# Patient Record
Sex: MALE | Race: WHITE | NOT HISPANIC OR LATINO | Employment: FULL TIME | URBAN - METROPOLITAN AREA
[De-identification: names, ages, dates, MRNs, and addresses within clinical notes are randomized per-mention and may not be internally consistent; named-entity substitution may affect disease eponyms.]

---

## 2023-02-23 LAB
ALANINE AMINOTRANSFERASE (SGPT) (U/L) IN SER/PLAS: 60 U/L (ref 10–52)
ALBUMIN (G/DL) IN SER/PLAS: 4.4 G/DL (ref 3.4–5)
ALKALINE PHOSPHATASE (U/L) IN SER/PLAS: 86 U/L (ref 33–136)
ANION GAP IN SER/PLAS: 14 MMOL/L (ref 10–20)
ASPARTATE AMINOTRANSFERASE (SGOT) (U/L) IN SER/PLAS: 41 U/L (ref 9–39)
BASOPHILS (10*3/UL) IN BLOOD BY AUTOMATED COUNT: 0.07 X10E9/L (ref 0–0.1)
BASOPHILS/100 LEUKOCYTES IN BLOOD BY AUTOMATED COUNT: 1.2 % (ref 0–2)
BILIRUBIN TOTAL (MG/DL) IN SER/PLAS: 0.4 MG/DL (ref 0–1.2)
CALCIUM (MG/DL) IN SER/PLAS: 9.3 MG/DL (ref 8.6–10.6)
CARBON DIOXIDE, TOTAL (MMOL/L) IN SER/PLAS: 26 MMOL/L (ref 21–32)
CHLORIDE (MMOL/L) IN SER/PLAS: 101 MMOL/L (ref 98–107)
CREATININE (MG/DL) IN SER/PLAS: 0.82 MG/DL (ref 0.5–1.3)
EOSINOPHILS (10*3/UL) IN BLOOD BY AUTOMATED COUNT: 0.09 X10E9/L (ref 0–0.7)
EOSINOPHILS/100 LEUKOCYTES IN BLOOD BY AUTOMATED COUNT: 1.6 % (ref 0–6)
ERYTHROCYTE DISTRIBUTION WIDTH (RATIO) BY AUTOMATED COUNT: 12.6 % (ref 11.5–14.5)
ERYTHROCYTE MEAN CORPUSCULAR HEMOGLOBIN CONCENTRATION (G/DL) BY AUTOMATED: 32.7 G/DL (ref 32–36)
ERYTHROCYTE MEAN CORPUSCULAR VOLUME (FL) BY AUTOMATED COUNT: 90 FL (ref 80–100)
ERYTHROCYTES (10*6/UL) IN BLOOD BY AUTOMATED COUNT: 5 X10E12/L (ref 4.5–5.9)
GFR MALE: >90 ML/MIN/1.73M2
GLUCOSE (MG/DL) IN SER/PLAS: 90 MG/DL (ref 74–99)
HEMATOCRIT (%) IN BLOOD BY AUTOMATED COUNT: 44.9 % (ref 41–52)
HEMOGLOBIN (G/DL) IN BLOOD: 14.7 G/DL (ref 13.5–17.5)
IMMATURE GRANULOCYTES/100 LEUKOCYTES IN BLOOD BY AUTOMATED COUNT: 0.2 % (ref 0–0.9)
LEUKOCYTES (10*3/UL) IN BLOOD BY AUTOMATED COUNT: 5.8 X10E9/L (ref 4.4–11.3)
LYMPHOCYTES (10*3/UL) IN BLOOD BY AUTOMATED COUNT: 1.85 X10E9/L (ref 1.2–4.8)
LYMPHOCYTES/100 LEUKOCYTES IN BLOOD BY AUTOMATED COUNT: 32.1 % (ref 13–44)
MONOCYTES (10*3/UL) IN BLOOD BY AUTOMATED COUNT: 0.57 X10E9/L (ref 0.1–1)
MONOCYTES/100 LEUKOCYTES IN BLOOD BY AUTOMATED COUNT: 9.9 % (ref 2–10)
NEUTROPHILS (10*3/UL) IN BLOOD BY AUTOMATED COUNT: 3.18 X10E9/L (ref 1.2–7.7)
NEUTROPHILS/100 LEUKOCYTES IN BLOOD BY AUTOMATED COUNT: 55 % (ref 40–80)
NRBC (PER 100 WBCS) BY AUTOMATED COUNT: 0 /100 WBC (ref 0–0)
PLATELETS (10*3/UL) IN BLOOD AUTOMATED COUNT: 330 X10E9/L (ref 150–450)
POTASSIUM (MMOL/L) IN SER/PLAS: 4.3 MMOL/L (ref 3.5–5.3)
PROTEIN TOTAL: 6.8 G/DL (ref 6.4–8.2)
SODIUM (MMOL/L) IN SER/PLAS: 137 MMOL/L (ref 136–145)
UREA NITROGEN (MG/DL) IN SER/PLAS: 9 MG/DL (ref 6–23)

## 2023-07-21 PROBLEM — M54.2 CERVICAL PAIN: Status: ACTIVE | Noted: 2023-07-21

## 2023-07-21 PROBLEM — E27.40 ADRENAL INSUFFICIENCY (MULTI): Status: ACTIVE | Noted: 2023-07-21

## 2023-07-21 PROBLEM — M54.6 ACUTE MIDLINE THORACIC BACK PAIN: Status: ACTIVE | Noted: 2023-07-21

## 2023-07-21 PROBLEM — K11.1 ENLARGED SALIVARY GLAND: Status: ACTIVE | Noted: 2023-07-21

## 2023-07-21 PROBLEM — R07.89 CHEST HEAVINESS: Status: ACTIVE | Noted: 2023-07-21

## 2023-07-21 PROBLEM — H61.23 BILATERAL IMPACTED CERUMEN: Status: ACTIVE | Noted: 2023-07-21

## 2023-07-21 PROBLEM — N40.0 BPH (BENIGN PROSTATIC HYPERPLASIA): Status: ACTIVE | Noted: 2023-07-21

## 2023-07-21 PROBLEM — M41.9 SCOLIOSIS: Status: ACTIVE | Noted: 2023-07-21

## 2023-07-21 PROBLEM — J01.10 ACUTE FRONTAL SINUSITIS: Status: ACTIVE | Noted: 2023-07-21

## 2023-07-21 PROBLEM — M35.00: Status: ACTIVE | Noted: 2023-07-21

## 2023-07-21 PROBLEM — E55.9 VITAMIN D DEFICIENCY: Status: ACTIVE | Noted: 2023-07-21

## 2023-07-21 PROBLEM — J84.9 ILD (INTERSTITIAL LUNG DISEASE) (MULTI): Status: ACTIVE | Noted: 2023-07-21

## 2023-07-21 PROBLEM — E88.01 ALPHA-1-ANTITRYPSIN DEFICIENCY (MULTI): Status: ACTIVE | Noted: 2023-07-21

## 2023-07-21 PROBLEM — R05.3 CHRONIC COUGH: Status: ACTIVE | Noted: 2023-07-21

## 2023-07-21 PROBLEM — R06.02 SHORTNESS OF BREATH: Status: ACTIVE | Noted: 2023-07-21

## 2023-07-21 PROBLEM — J38.3 FALSE VOCAL CORD LESION: Status: ACTIVE | Noted: 2023-07-21

## 2023-07-21 PROBLEM — K82.8 BILIARY DYSKINESIA: Status: ACTIVE | Noted: 2023-07-21

## 2023-07-21 PROBLEM — M51.37 DDD (DEGENERATIVE DISC DISEASE), LUMBOSACRAL: Status: ACTIVE | Noted: 2023-07-21

## 2023-07-21 PROBLEM — M79.604 PAIN OF RIGHT LOWER EXTREMITY: Status: ACTIVE | Noted: 2023-07-21

## 2023-07-21 PROBLEM — J31.0 CHRONIC RHINITIS: Status: ACTIVE | Noted: 2023-07-21

## 2023-07-21 PROBLEM — M54.16 LUMBAR RADICULOPATHY: Status: ACTIVE | Noted: 2023-07-21

## 2023-07-21 PROBLEM — J34.2 DEVIATED NASAL SEPTUM: Status: ACTIVE | Noted: 2023-07-21

## 2023-07-21 PROBLEM — I10 BENIGN ESSENTIAL HYPERTENSION: Status: ACTIVE | Noted: 2023-07-21

## 2023-07-21 PROBLEM — R09.82 POSTNASAL DRIP: Status: ACTIVE | Noted: 2023-07-21

## 2023-07-21 PROBLEM — M25.522 LEFT ELBOW PAIN: Status: ACTIVE | Noted: 2023-07-21

## 2023-07-21 PROBLEM — M21.612 BILATERAL BUNIONS: Status: ACTIVE | Noted: 2023-07-21

## 2023-07-21 PROBLEM — M21.611 BILATERAL BUNIONS: Status: ACTIVE | Noted: 2023-07-21

## 2023-07-21 PROBLEM — M51.379 DDD (DEGENERATIVE DISC DISEASE), LUMBOSACRAL: Status: ACTIVE | Noted: 2023-07-21

## 2023-07-21 PROBLEM — J01.00 ACUTE MAXILLARY SINUSITIS: Status: ACTIVE | Noted: 2023-07-21

## 2023-07-21 PROBLEM — J30.9 ALLERGIC RHINITIS: Status: ACTIVE | Noted: 2023-07-21

## 2023-07-21 PROBLEM — J38.02 BILATERAL VOCAL CORD PARESIS: Status: ACTIVE | Noted: 2023-07-21

## 2023-07-21 PROBLEM — R93.89 ABNORMAL CHEST CT: Status: ACTIVE | Noted: 2023-07-21

## 2023-07-21 PROBLEM — D83.9 CVID (COMMON VARIABLE IMMUNODEFICIENCY) (MULTI): Status: ACTIVE | Noted: 2023-07-21

## 2023-07-21 PROBLEM — M25.50 POLYARTHRALGIA: Status: ACTIVE | Noted: 2023-07-21

## 2023-07-21 PROBLEM — R00.2 PALPITATIONS: Status: ACTIVE | Noted: 2023-07-21

## 2023-07-21 PROBLEM — J32.2 CHRONIC ETHMOIDAL SINUSITIS: Status: ACTIVE | Noted: 2023-07-21

## 2023-07-21 PROBLEM — R42 DIZZINESS: Status: ACTIVE | Noted: 2023-07-21

## 2023-07-21 PROBLEM — J38.3 GLOTTIC INSUFFICIENCY: Status: ACTIVE | Noted: 2023-07-21

## 2023-07-21 PROBLEM — R06.09 DYSPNEA ON EXERTION: Status: ACTIVE | Noted: 2023-07-21

## 2023-07-21 PROBLEM — M54.41 ACUTE MIDLINE LOW BACK PAIN WITH RIGHT-SIDED SCIATICA: Status: ACTIVE | Noted: 2023-07-21

## 2023-07-21 PROBLEM — J32.9 CHRONIC RECURRENT SINUSITIS: Status: ACTIVE | Noted: 2023-07-21

## 2023-07-21 PROBLEM — K57.90 DIVERTICULOSIS: Status: ACTIVE | Noted: 2023-07-21

## 2023-07-21 PROBLEM — M54.12 CERVICAL RADICULOPATHY: Status: ACTIVE | Noted: 2023-07-21

## 2023-07-21 PROBLEM — J45.909 ASTHMA IN ADULT (HHS-HCC): Status: ACTIVE | Noted: 2023-07-21

## 2023-07-21 PROBLEM — M79.2 RADICULAR PAIN OF LEFT UPPER EXTREMITY: Status: ACTIVE | Noted: 2023-07-21

## 2023-07-21 PROBLEM — R09.81 NASAL CONGESTION: Status: ACTIVE | Noted: 2023-07-21

## 2023-07-21 PROBLEM — D80.3 IGG DEFICIENCY (MULTI): Status: ACTIVE | Noted: 2023-07-21

## 2023-07-21 PROBLEM — J84.10 GRANULOMATOUS LUNG DISEASE (MULTI): Status: ACTIVE | Noted: 2023-07-21

## 2023-07-21 PROBLEM — G47.33 OBSTRUCTIVE SLEEP APNEA: Status: ACTIVE | Noted: 2023-07-21

## 2023-07-21 PROBLEM — J45.909 MODERATE ASTHMA WITHOUT COMPLICATION (HHS-HCC): Status: ACTIVE | Noted: 2023-07-21

## 2023-07-21 PROBLEM — R19.7 DIARRHEA: Status: ACTIVE | Noted: 2023-07-21

## 2023-07-21 PROBLEM — Q76.1 KLIPPEL-FEIL SYNDROME: Status: ACTIVE | Noted: 2023-07-21

## 2023-07-21 PROBLEM — J32.4 CHRONIC PANSINUSITIS: Status: ACTIVE | Noted: 2023-07-21

## 2023-07-21 PROBLEM — R43.8 DECREASED SENSE OF SMELL: Status: ACTIVE | Noted: 2023-07-21

## 2023-07-21 PROBLEM — K40.20 BILATERAL INGUINAL HERNIA: Status: ACTIVE | Noted: 2023-07-21

## 2023-07-21 PROBLEM — R91.8 LUNG INFILTRATE: Status: ACTIVE | Noted: 2023-07-21

## 2023-07-21 PROBLEM — R49.0 HOARSENESS OF VOICE: Status: ACTIVE | Noted: 2023-07-21

## 2023-07-21 PROBLEM — M54.50 ACUTE LOW BACK PAIN: Status: ACTIVE | Noted: 2023-07-21

## 2023-07-21 PROBLEM — M54.31 SCIATICA OF RIGHT SIDE: Status: ACTIVE | Noted: 2023-07-21

## 2023-07-21 PROBLEM — J20.9 ACUTE BRONCHITIS: Status: ACTIVE | Noted: 2023-07-21

## 2023-07-21 PROBLEM — R51.9 HEADACHE: Status: ACTIVE | Noted: 2023-07-21

## 2023-08-17 PROBLEM — Z00.00 WELL ADULT EXAM: Status: ACTIVE | Noted: 2023-08-17

## 2023-09-21 DIAGNOSIS — I10 BENIGN ESSENTIAL HYPERTENSION: ICD-10-CM

## 2023-09-22 RX ORDER — AMLODIPINE BESYLATE 5 MG/1
5 TABLET ORAL DAILY
Qty: 90 TABLET | Refills: 0 | Status: SHIPPED | OUTPATIENT
Start: 2023-09-22

## 2024-08-30 ENCOUNTER — OFFICE VISIT (OUTPATIENT)
Dept: PRIMARY CARE | Facility: CLINIC | Age: 64
End: 2024-08-30
Payer: COMMERCIAL

## 2024-08-30 VITALS
HEIGHT: 70 IN | BODY MASS INDEX: 24.2 KG/M2 | WEIGHT: 169 LBS | HEART RATE: 66 BPM | DIASTOLIC BLOOD PRESSURE: 82 MMHG | SYSTOLIC BLOOD PRESSURE: 126 MMHG

## 2024-08-30 DIAGNOSIS — I10 BENIGN ESSENTIAL HYPERTENSION: Primary | ICD-10-CM

## 2024-08-30 DIAGNOSIS — Z79.1 ENCOUNTER FOR MONITORING CHRONIC NSAID THERAPY: ICD-10-CM

## 2024-08-30 DIAGNOSIS — Z51.81 ENCOUNTER FOR MONITORING CHRONIC NSAID THERAPY: ICD-10-CM

## 2024-08-30 PROCEDURE — 1036F TOBACCO NON-USER: CPT | Performed by: INTERNAL MEDICINE

## 2024-08-30 PROCEDURE — 3074F SYST BP LT 130 MM HG: CPT | Performed by: INTERNAL MEDICINE

## 2024-08-30 PROCEDURE — 99213 OFFICE O/P EST LOW 20 MIN: CPT | Performed by: INTERNAL MEDICINE

## 2024-08-30 PROCEDURE — 3008F BODY MASS INDEX DOCD: CPT | Performed by: INTERNAL MEDICINE

## 2024-08-30 PROCEDURE — 3078F DIAST BP <80 MM HG: CPT | Performed by: INTERNAL MEDICINE

## 2024-08-30 RX ORDER — DULOXETIN HYDROCHLORIDE 60 MG/1
60 CAPSULE, DELAYED RELEASE ORAL
COMMUNITY
Start: 2024-03-21

## 2024-08-30 RX ORDER — OMEPRAZOLE 20 MG/1
20 CAPSULE, DELAYED RELEASE ORAL DAILY
Qty: 30 CAPSULE | Refills: 5 | Status: SHIPPED | OUTPATIENT
Start: 2024-08-30 | End: 2025-02-26

## 2024-08-30 RX ORDER — BUDESONIDE 0.5 MG/2ML
0.5 INHALANT ORAL AS NEEDED
COMMUNITY
Start: 2024-02-16

## 2024-08-30 RX ORDER — TEZEPELUMAB-EKKO 210 MG/1.9ML
210 INJECTION, SOLUTION SUBCUTANEOUS
COMMUNITY

## 2024-08-30 RX ORDER — FAMOTIDINE 40 MG/1
1 TABLET, FILM COATED ORAL
COMMUNITY
Start: 2024-04-15

## 2024-08-30 RX ORDER — MELOXICAM 15 MG/1
15 TABLET ORAL
COMMUNITY
Start: 2024-03-21

## 2024-08-30 RX ORDER — DICLOFENAC SODIUM 75 MG/1
75 TABLET, DELAYED RELEASE ORAL 2 TIMES DAILY
COMMUNITY
Start: 2023-10-12 | End: 2024-08-30 | Stop reason: WASHOUT

## 2024-08-30 ASSESSMENT — PATIENT HEALTH QUESTIONNAIRE - PHQ9
SUM OF ALL RESPONSES TO PHQ9 QUESTIONS 1 AND 2: 0
2. FEELING DOWN, DEPRESSED OR HOPELESS: NOT AT ALL
1. LITTLE INTEREST OR PLEASURE IN DOING THINGS: NOT AT ALL

## 2024-08-30 ASSESSMENT — ENCOUNTER SYMPTOMS: FATIGUE: 0

## 2024-08-30 NOTE — PROGRESS NOTES
"Subjective   Patient ID: Al Brown is a 64 y.o. male who presents for Hypertension.    Here for high blood pressure. Gets IVIG every 3 weeks and has been around 130-150s. Had run out of amlodipine but was resumed a week ago.    PMH:  -HTN: Takes amlodipine 5mg daily.  -Asthma: Saw Dr. Riojas once. Otherwise is managed by his immunologist.   -Arthritis, myalgias, cervical spondylosis: Sees Dr. Winchester for this. Takes mobic currently.  -Chronic neck pain: Sees spine specialist. Told he will need to get an MRI and see ortho for his R shoulder before fully addressing the neck.  -Sjogrens: Tested positive for this. Sees rheumatology.  -CVID: Gets IVIG every 3 weeks. Sees Dr. Hagan at [facility].        Review of Systems   Constitutional:  Negative for fatigue.       /82   Pulse 66   Ht 1.778 m (5' 10\")   Wt 76.7 kg (169 lb)   BMI 24.25 kg/m²   Objective   Physical Exam  Constitutional:       General: He is not in acute distress.     Appearance: He is not ill-appearing, toxic-appearing or diaphoretic.   HENT:      Head: Normocephalic and atraumatic.   Neurological:      Mental Status: He is alert.         Assessment/Plan   Problem List Items Addressed This Visit             ICD-10-CM    Benign essential hypertension - Primary I10     -BP controlled today. Likely BP worsened since pt had run out of amlodipine. Dr. ANDERS ordered a months worth. Pt to reach out when he needs this refilled.          Other Visit Diagnoses         Codes    Encounter for monitoring chronic NSAID therapy     Z51.81, Z79.1    Relevant Medications    omeprazole (PriLOSEC) 20 mg DR capsule        Pt due for colon cancer screening; to let us know if he wants to do a colonoscopy vs cologuard.         Ismael Noe MD 08/30/24 3:05 PM   "

## 2024-08-30 NOTE — ASSESSMENT & PLAN NOTE
-BP controlled today. Likely BP worsened since pt had run out of amlodipine. Dr. ANDERS ordered a months worth. Pt to reach out when he needs this refilled.

## 2024-09-11 ENCOUNTER — APPOINTMENT (OUTPATIENT)
Dept: PRIMARY CARE | Facility: CLINIC | Age: 64
End: 2024-09-11

## 2024-09-16 DIAGNOSIS — I10 BENIGN ESSENTIAL HYPERTENSION: ICD-10-CM

## 2024-09-16 RX ORDER — AMLODIPINE BESYLATE 5 MG/1
5 TABLET ORAL DAILY
Qty: 90 TABLET | Refills: 0 | Status: SHIPPED | OUTPATIENT
Start: 2024-09-16

## 2024-12-03 ENCOUNTER — APPOINTMENT (OUTPATIENT)
Dept: PRIMARY CARE | Facility: CLINIC | Age: 64
End: 2024-12-03
Payer: COMMERCIAL

## 2025-01-24 ENCOUNTER — APPOINTMENT (OUTPATIENT)
Dept: PRIMARY CARE | Facility: CLINIC | Age: 65
End: 2025-01-24
Payer: COMMERCIAL

## 2025-01-28 ENCOUNTER — APPOINTMENT (OUTPATIENT)
Dept: PRIMARY CARE | Facility: CLINIC | Age: 65
End: 2025-01-28
Payer: COMMERCIAL

## 2025-01-28 VITALS
WEIGHT: 178.5 LBS | BODY MASS INDEX: 25.61 KG/M2 | SYSTOLIC BLOOD PRESSURE: 148 MMHG | HEART RATE: 68 BPM | DIASTOLIC BLOOD PRESSURE: 72 MMHG | OXYGEN SATURATION: 97 % | RESPIRATION RATE: 16 BRPM

## 2025-01-28 DIAGNOSIS — I10 BENIGN ESSENTIAL HYPERTENSION: Primary | ICD-10-CM

## 2025-01-28 PROCEDURE — 3077F SYST BP >= 140 MM HG: CPT | Performed by: INTERNAL MEDICINE

## 2025-01-28 PROCEDURE — 3078F DIAST BP <80 MM HG: CPT | Performed by: INTERNAL MEDICINE

## 2025-01-28 PROCEDURE — 99213 OFFICE O/P EST LOW 20 MIN: CPT | Performed by: INTERNAL MEDICINE

## 2025-01-28 RX ORDER — AMLODIPINE BESYLATE 10 MG/1
10 TABLET ORAL DAILY
Qty: 90 TABLET | Refills: 3 | Status: SHIPPED | OUTPATIENT
Start: 2025-01-28

## 2025-01-28 ASSESSMENT — ENCOUNTER SYMPTOMS
DIZZINESS: 1
LIGHT-HEADEDNESS: 1

## 2025-01-28 NOTE — PROGRESS NOTES
Subjective   Patient ID: Al Brown is a 64 y.o. male who presents for Follow-up.    Here for blood pressure follow up. Has seen BP in his 150s systolic over 80-90s.    Is doing PT for his shoulder and back (doing water therapy and regular PT).    Has an EGD planned to evaluate for possible ulcer.    Got nerve blocks since last appt.        Review of Systems   Neurological:  Positive for dizziness and light-headedness.       /72 (BP Location: Right arm, Patient Position: Sitting)   Pulse 68   Resp 16   Wt 81 kg (178 lb 8 oz)   SpO2 97%   BMI 25.61 kg/m²   Objective   Physical Exam  Constitutional:       General: He is not in acute distress.     Appearance: He is not ill-appearing, toxic-appearing or diaphoretic.   HENT:      Head: Normocephalic and atraumatic.   Eyes:      Conjunctiva/sclera: Conjunctivae normal.   Neurological:      Mental Status: He is alert.         Assessment/Plan   Problem List Items Addressed This Visit             ICD-10-CM    Benign essential hypertension - Primary I10     -BP previously controlled but lately its been higher for pt. Has been noted at his infusions and at other doctors appts. On average is seeing BP get into the 140-150s at these appts. Is in setting of pain occasionally.  -Just taking amlodipine 5mg daily. Will double dose to 10mg daily. Recommended pt return in 2-4 weeks for physical and BP follow up, but pt has numerous appts at that time. Will instead see back in 3-4 months for physical and follow up. Pt agreeable.         Relevant Medications    amLODIPine (Norvasc) 10 mg tablet            Ismael Noe MD 01/28/25 10:57 AM

## 2025-01-28 NOTE — ASSESSMENT & PLAN NOTE
-BP previously controlled but lately its been higher for pt. Has been noted at his infusions and at other doctors appts. On average is seeing BP get into the 140-150s at these appts. Is in setting of pain occasionally.  -Just taking amlodipine 5mg daily. Will double dose to 10mg daily. Recommended pt return in 2-4 weeks for physical and BP follow up, but pt has numerous appts at that time. Will instead see back in 3-4 months for physical and follow up. Pt agreeable.

## 2025-02-04 ENCOUNTER — TELEPHONE (OUTPATIENT)
Dept: PRIMARY CARE | Facility: CLINIC | Age: 65
End: 2025-02-04
Payer: COMMERCIAL

## 2025-02-04 DIAGNOSIS — I10 BENIGN ESSENTIAL HYPERTENSION: Primary | ICD-10-CM

## 2025-02-04 RX ORDER — AMLODIPINE BESYLATE 5 MG/1
5 TABLET ORAL DAILY
Qty: 90 TABLET | Refills: 3 | Status: SHIPPED | OUTPATIENT
Start: 2025-02-04

## 2025-02-04 NOTE — TELEPHONE ENCOUNTER
Al called asking to drop down to 5 mg of Amlodipine, the 10 mg makes him light headed BP is fine, but he is getting out of breath, really seems since the 10 mg, tired too. Is that ok?    Please advise

## 2025-05-05 DIAGNOSIS — I10 BENIGN ESSENTIAL HYPERTENSION: ICD-10-CM

## 2025-05-05 RX ORDER — AMLODIPINE BESYLATE 10 MG/1
10 TABLET ORAL DAILY
Qty: 90 TABLET | Refills: 3 | Status: SHIPPED | OUTPATIENT
Start: 2025-05-05

## 2025-05-19 ENCOUNTER — APPOINTMENT (OUTPATIENT)
Dept: PRIMARY CARE | Facility: CLINIC | Age: 65
End: 2025-05-19
Payer: COMMERCIAL

## 2025-08-25 ENCOUNTER — APPOINTMENT (OUTPATIENT)
Dept: PRIMARY CARE | Facility: CLINIC | Age: 65
End: 2025-08-25
Payer: COMMERCIAL

## 2025-09-09 ENCOUNTER — APPOINTMENT (OUTPATIENT)
Dept: OTOLARYNGOLOGY | Facility: CLINIC | Age: 65
End: 2025-09-09
Payer: COMMERCIAL